# Patient Record
Sex: MALE | Race: WHITE
[De-identification: names, ages, dates, MRNs, and addresses within clinical notes are randomized per-mention and may not be internally consistent; named-entity substitution may affect disease eponyms.]

---

## 2020-07-19 ENCOUNTER — HOSPITAL ENCOUNTER (EMERGENCY)
Dept: HOSPITAL 41 - JD.ED | Age: 40
Discharge: HOME | End: 2020-07-19
Payer: COMMERCIAL

## 2020-07-19 VITALS — HEART RATE: 79 BPM | DIASTOLIC BLOOD PRESSURE: 95 MMHG | SYSTOLIC BLOOD PRESSURE: 131 MMHG

## 2020-07-19 DIAGNOSIS — S90.01XA: Primary | ICD-10-CM

## 2020-07-19 DIAGNOSIS — S50.812A: ICD-10-CM
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
## 2020-07-20 NOTE — CR
Right foot: 4 views of the right foot were obtained.

 

Comparison: No previous puts study is available.

 

Soft tissue swelling noted dorsally.  Mild deformity and slight 

sclerosis seen within the proximal phalanx of the 4th toe having the 

appearance of old healed fracture.  No acute fracture, dislocation or 

other bony abnormality is appreciated.

 

Impression:

1.  Soft tissue swelling.

2.  No acute bony abnormality is appreciated.

 

Diagnostic code #2

 

This report was dictated in MDT

## 2020-07-20 NOTE — CR
Right ankle: Single ankle mortise view was obtained of the right 

ankle.

 

Ankle mortise is symmetric.  No acute fracture or other bony 

abnormality is appreciated.

 

Impression:

1.  No abnormality is seen on ankle mortise right ankle exam.

 

Diagnostic code #1

 

This report was dictated in MDT